# Patient Record
Sex: MALE | Race: WHITE | NOT HISPANIC OR LATINO
[De-identification: names, ages, dates, MRNs, and addresses within clinical notes are randomized per-mention and may not be internally consistent; named-entity substitution may affect disease eponyms.]

---

## 2022-04-29 ENCOUNTER — APPOINTMENT (OUTPATIENT)
Dept: NEUROSURGERY | Facility: CLINIC | Age: 27
End: 2022-04-29
Payer: COMMERCIAL

## 2022-04-29 VITALS
HEART RATE: 70 BPM | WEIGHT: 200 LBS | OXYGEN SATURATION: 97 % | BODY MASS INDEX: 31.39 KG/M2 | TEMPERATURE: 97.8 F | HEIGHT: 67 IN | DIASTOLIC BLOOD PRESSURE: 88 MMHG | SYSTOLIC BLOOD PRESSURE: 137 MMHG

## 2022-04-29 DIAGNOSIS — Z78.9 OTHER SPECIFIED HEALTH STATUS: ICD-10-CM

## 2022-04-29 DIAGNOSIS — Z87.891 PERSONAL HISTORY OF NICOTINE DEPENDENCE: ICD-10-CM

## 2022-04-29 DIAGNOSIS — Z82.49 FAMILY HISTORY OF ISCHEMIC HEART DISEASE AND OTHER DISEASES OF THE CIRCULATORY SYSTEM: ICD-10-CM

## 2022-04-29 PROCEDURE — 99203 OFFICE O/P NEW LOW 30 MIN: CPT

## 2022-04-29 RX ORDER — IBUPROFEN 200 MG/1
TABLET, COATED ORAL
Refills: 0 | Status: ACTIVE | COMMUNITY

## 2022-04-29 RX ORDER — MELOXICAM 7.5 MG/1
7.5 TABLET ORAL TWICE DAILY
Qty: 60 | Refills: 3 | Status: ACTIVE | COMMUNITY
Start: 2022-04-29 | End: 1900-01-01

## 2022-04-29 NOTE — CONSULT LETTER
[Dear  ___] : Dear  [unfilled], [Courtesy Letter:] : I had the pleasure of seeing your patient, [unfilled], in my office today. [Sincerely,] : Sincerely, [FreeTextEntry3] : Cheryl Davey, MSN, FNP-BC\par Nurse Practitioner\par Neurosurgery\par [address] [place] [address], 2nd floor \[place] [address] \par Office: [phone] \par Fax: [phone]\par \par  [FreeTextEntry1] : I had the pleasure of seeing your patient, KIRILL RAY, in my office today. Kirill Ray is a 26-year-old male who presents today with lumbar symptoms. Patient reports symptoms started approximately 8 years ago without any specific event. He reports a deep ache, dull sharp pain and stiffness to the lower back with radiation to bilateral buttocks. He denies any leg pain. He denies any lower extremity numbness, tingling or weakness. He denies any saddle anesthesia or bowel bladder dysfunction. He denies any difficulties with walking or tripping over his feet. Patient reports mechanical movements do not worsen symptoms. Prolonged sitting and standing aggravate symptoms. Patient also reports a very mild decrease sensation to his left lateral thigh region. Patient reports Advil does not provide him with any relief.\par \par There is no imaging to review with the patient.\par \par Patient is alert and oriented. No distress noted. Strength to bilateral legs 5/5. Negative clonus. Sensation to light touch to lower extremities equal and normal. Reflexes to lower extremity present and equal bilaterally. No pain elicited with palpation to the lumbar spine.\par \par I provided the patient with a prescription for meloxicam. Provided patient with prescription for x-ray of the lumbar spine and referral for physical therapy. We will follow-up 6 to 8 weeks to evaluate for progression. Patient is aware to call with any further questions or concerns or with any new or worsening symptoms.

## 2022-05-18 ENCOUNTER — EMERGENCY (EMERGENCY)
Facility: HOSPITAL | Age: 27
LOS: 0 days | Discharge: HOME | End: 2022-05-18
Attending: EMERGENCY MEDICINE
Payer: COMMERCIAL

## 2022-05-18 VITALS
DIASTOLIC BLOOD PRESSURE: 49 MMHG | TEMPERATURE: 100 F | HEART RATE: 105 BPM | SYSTOLIC BLOOD PRESSURE: 126 MMHG | RESPIRATION RATE: 17 BRPM | OXYGEN SATURATION: 98 %

## 2022-05-18 VITALS — WEIGHT: 154.98 LBS | HEIGHT: 68 IN

## 2022-05-18 DIAGNOSIS — M54.9 DORSALGIA, UNSPECIFIED: ICD-10-CM

## 2022-05-18 DIAGNOSIS — R50.9 FEVER, UNSPECIFIED: ICD-10-CM

## 2022-05-18 DIAGNOSIS — R00.0 TACHYCARDIA, UNSPECIFIED: ICD-10-CM

## 2022-05-18 DIAGNOSIS — U07.1 COVID-19: ICD-10-CM

## 2022-05-18 LAB
FLUAV AG NPH QL: SIGNIFICANT CHANGE UP
FLUBV AG NPH QL: SIGNIFICANT CHANGE UP
RSV RNA NPH QL NAA+NON-PROBE: SIGNIFICANT CHANGE UP
SARS-COV-2 RNA SPEC QL NAA+PROBE: DETECTED

## 2022-05-18 PROCEDURE — 99283 EMERGENCY DEPT VISIT LOW MDM: CPT | Mod: 25

## 2022-05-18 PROCEDURE — 0241U: CPT

## 2022-05-18 PROCEDURE — 71045 X-RAY EXAM CHEST 1 VIEW: CPT | Mod: 26

## 2022-05-18 PROCEDURE — 99284 EMERGENCY DEPT VISIT MOD MDM: CPT

## 2022-05-18 PROCEDURE — 71045 X-RAY EXAM CHEST 1 VIEW: CPT

## 2022-05-18 RX ORDER — ACETAMINOPHEN 500 MG
650 TABLET ORAL ONCE
Refills: 0 | Status: COMPLETED | OUTPATIENT
Start: 2022-05-18 | End: 2022-05-18

## 2022-05-18 RX ORDER — SODIUM CHLORIDE 9 MG/ML
1000 INJECTION INTRAMUSCULAR; INTRAVENOUS; SUBCUTANEOUS ONCE
Refills: 0 | Status: COMPLETED | OUTPATIENT
Start: 2022-05-18 | End: 2022-05-18

## 2022-05-18 RX ORDER — IBUPROFEN 200 MG
600 TABLET ORAL ONCE
Refills: 0 | Status: COMPLETED | OUTPATIENT
Start: 2022-05-18 | End: 2022-05-18

## 2022-05-18 RX ADMIN — Medication 650 MILLIGRAM(S): at 16:41

## 2022-05-18 RX ADMIN — Medication 650 MILLIGRAM(S): at 17:11

## 2022-05-18 RX ADMIN — SODIUM CHLORIDE 1000 MILLILITER(S): 9 INJECTION INTRAMUSCULAR; INTRAVENOUS; SUBCUTANEOUS at 16:41

## 2022-05-18 RX ADMIN — Medication 600 MILLIGRAM(S): at 17:13

## 2022-05-18 NOTE — ED STATDOCS - NS ED ROS FT
Constitutional: No chills +fever, +decreased appetite  Eyes: No visual changes  HEENT: No throat pain  CV: No chest pain  Resp: No SOB no cough  GI: No abd pain, nausea or vomiting  : No dysuria  MSK: +back pain  Skin: No rash  Neuro: No headache

## 2022-05-18 NOTE — ED ADULT TRIAGE NOTE - CHIEF COMPLAINT QUOTE
woke up with back pain this morning. dysuria started 2hrs pta. Denies hematuria, urinary frequency. Fever tmax 101 today.

## 2022-05-18 NOTE — ED STATDOCS - NS ED ATTENDING STATEMENT MOD
I have seen and examined this patient and fully participated in the care of this patient as the teaching attending.  The service was shared with the ASHLEE.  I reviewed and verified the documentation and independently performed the documented:

## 2022-05-18 NOTE — ED STATDOCS - PROGRESS NOTE DETAILS
pt here with cough. back pain after working out yesterday and fever. pt denies taking any medication for symptoms. pt PE wnl plan: ivfs, tylenol, cxr rvp panel and reeval. -Kristin Rebolledo PA-C pt aware of cxr and rvp panel results and knows to return to ED for any worsening of symptoms. pt well appearing and aware of monoclonals treatment will think about it will give him the information if he meets criteria. pt agrees with plan. -Kristin Rebolledo PA-C

## 2022-05-18 NOTE — ED STATDOCS - PATIENT PORTAL LINK FT
You can access the FollowMyHealth Patient Portal offered by [facility] by registering at the following website: [email or web address]. By joining [employer]’s FollowMyHealth portal, you will also be able to view your health information using other applications (apps) compatible with our system.

## 2022-05-18 NOTE — ED STATDOCS - NSFOLLOWUPINSTRUCTIONS_ED_ALL_ED_FT
pt is well appearing on dc, pt advised to stay home  quarantine and pt knows to return to ed for any worsening of symptoms including chest pain, sob, dyspnea or any other complaints. pt advised to increase hydration and fluids and to take  Tylenol for symptoms if needed. pt knows to return to ed for any worsening of symptoms.

## 2022-05-18 NOTE — ED STATDOCS - PHYSICAL EXAMINATION
Constitutional: NAD AOx3  Eyes: PERRL EOMI  Head: Normocephalic atraumatic  Mouth: MMM  Cardiac: regular rhythm +tachycardic  Resp: Lungs CTAB  GI: Abd s/nd/nt  Neuro: CN2-12 grossly intact, CUENCA x 4  Skin: No visible rashes

## 2022-06-10 ENCOUNTER — APPOINTMENT (OUTPATIENT)
Dept: RADIOLOGY | Facility: CLINIC | Age: 27
End: 2022-06-10
Payer: COMMERCIAL

## 2022-06-10 ENCOUNTER — OUTPATIENT (OUTPATIENT)
Dept: OUTPATIENT SERVICES | Facility: HOSPITAL | Age: 27
LOS: 1 days | End: 2022-06-10
Payer: COMMERCIAL

## 2022-06-10 DIAGNOSIS — M54.50 LOW BACK PAIN, UNSPECIFIED: ICD-10-CM

## 2022-06-10 PROBLEM — Z78.9 OTHER SPECIFIED HEALTH STATUS: Chronic | Status: ACTIVE | Noted: 2022-05-18

## 2022-06-10 PROCEDURE — 72110 X-RAY EXAM L-2 SPINE 4/>VWS: CPT

## 2022-06-10 PROCEDURE — 72110 X-RAY EXAM L-2 SPINE 4/>VWS: CPT | Mod: 26

## 2022-06-15 ENCOUNTER — APPOINTMENT (OUTPATIENT)
Dept: NEUROSURGERY | Facility: CLINIC | Age: 27
End: 2022-06-15
Payer: COMMERCIAL

## 2022-06-15 VITALS
DIASTOLIC BLOOD PRESSURE: 80 MMHG | WEIGHT: 190 LBS | HEIGHT: 67 IN | BODY MASS INDEX: 29.82 KG/M2 | TEMPERATURE: 97.4 F | OXYGEN SATURATION: 95 % | SYSTOLIC BLOOD PRESSURE: 121 MMHG | HEART RATE: 83 BPM

## 2022-06-15 DIAGNOSIS — M54.50 LOW BACK PAIN, UNSPECIFIED: ICD-10-CM

## 2022-06-15 DIAGNOSIS — M54.2 CERVICALGIA: ICD-10-CM

## 2022-06-15 DIAGNOSIS — R29.898 OTHER SYMPTOMS AND SIGNS INVOLVING THE MUSCULOSKELETAL SYSTEM: ICD-10-CM

## 2022-06-15 PROCEDURE — 99214 OFFICE O/P EST MOD 30 MIN: CPT

## 2022-06-15 NOTE — CONSULT LETTER
[Dear  ___] : Dear  [unfilled], [Courtesy Letter:] : I had the pleasure of seeing your patient, [unfilled], in my office today. [Sincerely,] : Sincerely, [FreeTextEntry1] : Blade Ray is a 27-year-old male who returns today with lumbar symptoms.  As you may recall, patient reports symptoms started approximately 8 years ago without any specific event.  He was last seen in our office on April 28, 2022 for lower back symptoms.  He was unable to undergo physical therapy as he had developed COVID.  Patient reports at this time he reports a stiff aching 5/10 lower back pain with radiation to bilateral hips.  He reports a sensation of lower extremity weakness and off balance.  He denies any lower extremity shooting pains or numbness or tingling.  He denies bowel or bladder dysfunction or saddle anesthesia.  He reports occasionally tripping with walking.\par \par Patient reports constant neck pain which started 1 month ago without any specific event.  He reports he experiences a shooting pain radiating to the back of his head.  He denies any upper extremity pain, numbness tingling, or weakness.  Denies dropping objects from his hands.\par \par Patient reports the meloxicam that was provided to him at the last visit has not provided him with any benefit.  He was evaluated at [facility] for his back pain on 6/10/2022.  At the time he had underwent an x-ray of the lumbar spine indicating no fractures or spondylolisthesis.\par \par Patient is alert and oriented.  No distress noted.  Negative Zbigniew's.  Negative clonus.  Strength to bilateral upper and lower extremities 5/5.  Patient has full sensation to light touch upper and lower extremities.  Upper extremity reflexes diminished.  Present and equal bilateral lower extremity reflexes.  Patient is walking without any noted difficulties.\par \par Given patient's persistent lower back symptoms I provide the patient with a prescription for an MRI of the lumbar spine.  I provide the patient with a prescription for x-ray of the cervical spine.  We will follow-up over the phone once images are completed at which time we will discuss the neck steps in the treatment process.  Patient is aware to call with any further questions or concerns or with any new or worsening symptoms. [FreeTextEntry3] : Cheryl Davey, MSN, FNP-BC\par Department of Neurosurgery \par [facility]\par [address] [place] [address], [address]nd floor\[place] [address]\par Office: [phone]\par Fax: [phone]\par

## 2022-06-29 ENCOUNTER — APPOINTMENT (OUTPATIENT)
Dept: MRI IMAGING | Facility: CLINIC | Age: 27
End: 2022-06-29
Payer: COMMERCIAL

## 2022-06-29 ENCOUNTER — OUTPATIENT (OUTPATIENT)
Dept: OUTPATIENT SERVICES | Facility: HOSPITAL | Age: 27
LOS: 1 days | End: 2022-06-29
Payer: COMMERCIAL

## 2022-06-29 ENCOUNTER — APPOINTMENT (OUTPATIENT)
Dept: RADIOLOGY | Facility: CLINIC | Age: 27
End: 2022-06-29
Payer: COMMERCIAL

## 2022-06-29 DIAGNOSIS — M54.50 LOW BACK PAIN, UNSPECIFIED: ICD-10-CM

## 2022-06-29 PROCEDURE — 72052 X-RAY EXAM NECK SPINE 6/>VWS: CPT

## 2022-06-29 PROCEDURE — 72148 MRI LUMBAR SPINE W/O DYE: CPT

## 2022-06-29 PROCEDURE — 72052 X-RAY EXAM NECK SPINE 6/>VWS: CPT | Mod: 26

## 2022-06-29 PROCEDURE — 72148 MRI LUMBAR SPINE W/O DYE: CPT | Mod: 26

## 2023-10-03 NOTE — ED ADULT NURSE NOTE - NS ED PATIENT SAFETY CONCERN
Abdomen , soft, nontender, nondistended , no guarding or rigidity , no masses palpable , normal bowel sounds , Liver and Spleen , no hepatomegaly present , liver nontender No

## 2025-07-15 NOTE — ED STATDOCS - OBJECTIVE STATEMENT
28 y/o male with no pertinent PMHx presents to the ED c/o back pain since this morning. Pt states he felt fine last night. Notes he has a decreased appetite. Also with a fever. Denies nausea, vomiting, diarrhea. No known sick contacts. +Vaccinated against Covid-19 x2 doses. electronic